# Patient Record
Sex: MALE | Race: OTHER
[De-identification: names, ages, dates, MRNs, and addresses within clinical notes are randomized per-mention and may not be internally consistent; named-entity substitution may affect disease eponyms.]

---

## 2018-12-15 ENCOUNTER — HOSPITAL ENCOUNTER (OUTPATIENT)
Dept: HOSPITAL 62 - RAD | Age: 33
End: 2018-12-15
Attending: ORTHOPAEDIC SURGERY
Payer: OTHER GOVERNMENT

## 2018-12-15 DIAGNOSIS — M25.512: Primary | ICD-10-CM

## 2018-12-15 DIAGNOSIS — M75.92: ICD-10-CM

## 2018-12-17 NOTE — RADIOLOGY REPORT (SQ)
EXAM DESCRIPTION:  MRI LT UPPER JOINT WITHOUT



COMPLETED DATE/TIME:  12/15/2018 9:59 am



REASON FOR STUDY:  PAIN IN LEFT SHOULDER M25.512  PAIN IN LEFT SHOULDER



COMPARISON:  None.



TECHNIQUE:  Left shoulder images acquired and stored on PACS. Multiplanar imaging to include fat sens
itive sequences such as T1, water sensitive sequences such as FST2/STIR, cartilage sensitive sequence
s such as FSPD/gradient-echo sequences.

Note:  Due to technical failure at the time of initial scanning (lack of fat saturation on many seque
nces), patient returns for further fat saturated sequences on 12/16/2018.



LIMITATIONS:  None.



FINDINGS:  BONE MARROW AND CORTEX: No worrisome bone lesions or marrow replacement. No occult fractur
es.

JOINT OR BURSAL EFFUSION: Trace joint fluid.

GLENO-HUMERAL ARTICULATION: Normal articulation. No subluxation. No cystic change. No osteophytes or 
cartilage loss.

ACROMION AND AC JOINT: No significant overgrowth.  No widening.  Subacromial space preserved.

ROTATOR CUFF AND INTERVAL: Mild cuff tendinosis without suggestion of significant tear.  Chronic shelby
s atrophy.

 LABRUM  AND BICEPS LABRAL COMPLEX: No suggestion of superior labral tear or biceps disease.

REMAINDER OF LABRUM AND IGHL : Generally intact.

PERIARTICULAR AND ADJACENT SOFT TISSUES: No masses or abnormal nodes.

OTHER: No other significant finding.



IMPRESSION:  1. Cuff tendinosis without suggestion of significant tear.  Note is made of fatty atroph
y in the teres muscle belly.  Other muscles are preserved.



TECHNICAL DOCUMENTATION:  JOB ID:  [id number]

 2011 [employer]- All Rights Reserved



Reading location - IP/workstation name: [email or web address]

## 2019-09-28 ENCOUNTER — HOSPITAL ENCOUNTER (OUTPATIENT)
Dept: HOSPITAL 62 - RAD | Age: 34
End: 2019-09-28
Attending: FAMILY MEDICINE
Payer: OTHER GOVERNMENT

## 2019-09-28 DIAGNOSIS — M54.5: Primary | ICD-10-CM

## 2019-09-28 PROCEDURE — 72148 MRI LUMBAR SPINE W/O DYE: CPT

## 2019-09-28 NOTE — RADIOLOGY REPORT (SQ)
EXAM DESCRIPTION:  MRI LUMBAR SPINE WITHOUT



COMPLETED DATE/TIME:  9/28/2019 8:42 am



REASON FOR STUDY:  (M54.5)LOW BACK PAIN M54.5  LOW BACK PAIN



COMPARISON:  None.



TECHNIQUE:  Sagittal and Axial imaging includes T1, T2, STIR and gradient echo sequences. Coronal T2/
HASTE imaging.



LIMITATIONS:  None.



FINDINGS:  VISUALIZED UPPER ABDOMEN:  Limited evaluation. No acute or suspicious findings suggested.

SEGMENTATION: Transitional S1.

ALIGNMENT: Anatomic.

VERTEBRAE: Intact.

BONE MARROW: Normal. No marrow replacement or reactive changes.

DISC SIGNAL: Loss of height and T2 signal L5-S1.

POSTERIOR ELEMENTS:  Generally intact.  No pars defect evident.

HARDWARE: None in the spine.

CORD AND CONUS: Normal in size and signal intensity. Conus at the appropriate level.

SOFT TISSUES: No aortic aneurysm seen. No bulky retroperitoneal adenopathy or mass. No paraspinal mas
s or fluid.

L1-L2: No significant spinal stenosis or exit foraminal stenosis.

L2-L3: No significant spinal stenosis or exit foraminal stenosis.

L3-L4: No significant spinal stenosis or exit foraminal stenosis.

L4-L5: No significant spinal stenosis or exit foraminal stenosis.

L5-S1: Degenerative disc with mild central protrusion.  Mild narrowing of the exit foramina.

LOWER THORACIC: Incompletely imaged.  No stenosis seen.

SACRUM: Visualized upper sacrum intact.

OTHER: No other significant findings.



IMPRESSION:  Degenerative disc L5-S1 with central protrusion mild narrowing of the exit foramina.



TECHNICAL DOCUMENTATION:  JOB ID:  [id number]

 2011 [employer]- All Rights Reserved



Reading location - IP/workstation name: [email or web address]

## 2020-03-07 ENCOUNTER — HOSPITAL ENCOUNTER (EMERGENCY)
Dept: HOSPITAL 62 - ER | Age: 35
Discharge: HOME | End: 2020-03-07
Payer: OTHER GOVERNMENT

## 2020-03-07 VITALS — DIASTOLIC BLOOD PRESSURE: 92 MMHG | SYSTOLIC BLOOD PRESSURE: 149 MMHG

## 2020-03-07 DIAGNOSIS — F17.200: ICD-10-CM

## 2020-03-07 DIAGNOSIS — L50.0: Primary | ICD-10-CM

## 2020-03-07 PROCEDURE — 99283 EMERGENCY DEPT VISIT LOW MDM: CPT

## 2020-03-07 NOTE — ER DOCUMENT REPORT
HPI





- HPI


Time Seen by Provider: 03/07/20 23:26


Pain Level: Denies


Notes: 





Otherwise healthy 34-year-old male with no history of any allergies presenting 

to the emergency department chief complaint of possible allergic reaction.  

Patient reports he had some new seasoning for popcorn a few hours prior to 

arrival.  He states the symptoms of hives and scratching in his throat started 

approximately an hour after ingestion.  He reports he took Benadryl and 

omeprazole which completely resolved his symptoms.








- EENT


EENT: REPORTS: Sore Throat





- CARDIOVASCULAR


Cardiovascular: DENIES: Chest pain





- RESPIRATORY


Respiratory: DENIES: Trouble Breathing, Coughing





- REPRODUCTIVE


Reproductive: DENIES: Pregnant:





Past Medical History





- General


Information source: Patient





- Social History


Smoking Status: Current Every Day Smoker


Frequency of alcohol use: None


Drug Abuse: None


Family History: Reviewed & Not Pertinent


Patient has suicidal ideation: No


Patient has homicidal ideation: No





- Medical History


Medical History: Negative


Past Surgical History: Reports: Hx Appendectomy





- Immunizations


Immunizations up to date: Yes


Hx Diphtheria, Pertussis, Tetanus Vaccination: Yes





Vertical Provider Document





- CONSTITUTIONAL


Notes: 





PHYSICAL EXAMINATION:





GENERAL: Well-appearing, well-nourished and in no acute distress.





HEAD: Atraumatic, normocephalic.





EYES: Pupils equal round extraocular movements intact,  conjunctiva are normal.





ENT: Nares patent





NECK: Normal range of motion





LUNGS: No respiratory distress





Musculoskeletal: Normal range of motion





NEUROLOGICAL:  Normal speech, normal gait. 





PSYCH: Normal mood, normal affect.





SKIN: Warm, Dry, normal turgor, no rashes or lesions noted.





- INFECTION CONTROL


TRAVEL OUTSIDE OF THE [place] IN LAST 30 DAYS: No





Course





- Re-evaluation


Re-evalutation: 





Patient appears well, nontoxic, no difficulty swallowing.  Hives have completely

resolved.  Patient will be encouraged to continue taking 50 mg of Benadryl every

6 hours.  He will also be prescribed prednisone and Pepcid.  ED return 

precautions discussed, patient verbalized understanding and agreement with same.





- Vital Signs


Vital signs: 


                                        











Temp Pulse Resp BP Pulse Ox


 


 98.0 F   68   18   149/92 H  100 


 


 03/07/20 23:02  03/07/20 23:02  03/07/20 23:02  03/07/20 23:02  03/07/20 23:02














Discharge





- Discharge


Clinical Impression: 


Allergic reaction


Qualifiers:


 Encounter type: initial encounter Qualified Code(s): T78.40XA - Allergy, 

unspecified, initial encounter





Condition: Stable


Disposition: HOME, SELF-CARE


Additional Instructions: 


Your symptoms are most consistent with an acute allergic reaction.  Obviously do

not ingest the substance that cause this again.  Take Benadryl 50 mg every 6 

hours.  Take other medications as prescribed.  You were given a first dose here 

in the emergency department today.  Return to the emergency department with any 

new or worsening symptoms.





Prescriptions: 


Prednisone [Deltasone 20 mg Tablet] 3 tab PO DAILY 5 Days #15 tablet


Famotidine [Pepcid 20 mg Tablet] 40 mg PO DAILY #10 tablet


Referrals: 


LUIZ SWAN MD [Primary Care Provider] - Follow up as needed